# Patient Record
Sex: FEMALE | Race: WHITE | NOT HISPANIC OR LATINO | Employment: FULL TIME | URBAN - NONMETROPOLITAN AREA
[De-identification: names, ages, dates, MRNs, and addresses within clinical notes are randomized per-mention and may not be internally consistent; named-entity substitution may affect disease eponyms.]

---

## 2025-08-09 ENCOUNTER — OFFICE VISIT (OUTPATIENT)
Dept: URGENT CARE | Facility: CLINIC | Age: 65
End: 2025-08-09
Payer: COMMERCIAL

## 2025-08-09 VITALS
SYSTOLIC BLOOD PRESSURE: 165 MMHG | RESPIRATION RATE: 15 BRPM | TEMPERATURE: 97.5 F | DIASTOLIC BLOOD PRESSURE: 83 MMHG | OXYGEN SATURATION: 96 % | HEART RATE: 60 BPM

## 2025-08-09 DIAGNOSIS — L24.9 IRRITANT CONTACT DERMATITIS, UNSPECIFIED TRIGGER: Primary | ICD-10-CM

## 2025-08-09 PROCEDURE — 2500000004 HC RX 250 GENERAL PHARMACY W/ HCPCS (ALT 636 FOR OP/ED): Mod: JZ | Performed by: NURSE PRACTITIONER

## 2025-08-09 PROCEDURE — 96372 THER/PROPH/DIAG INJ SC/IM: CPT | Performed by: NURSE PRACTITIONER

## 2025-08-09 PROCEDURE — 99213 OFFICE O/P EST LOW 20 MIN: CPT | Performed by: NURSE PRACTITIONER

## 2025-08-09 RX ORDER — METHYLPREDNISOLONE SODIUM SUCCINATE 125 MG/2ML
125 INJECTION INTRAMUSCULAR; INTRAVENOUS ONCE
Status: COMPLETED | OUTPATIENT
Start: 2025-08-09 | End: 2025-08-09

## 2025-08-09 RX ORDER — CHOLECALCIFEROL (VITAMIN D3) 50 MCG
2000 TABLET ORAL
COMMUNITY

## 2025-08-09 RX ORDER — ALENDRONATE SODIUM 70 MG/1
TABLET ORAL
COMMUNITY

## 2025-08-09 RX ORDER — PREDNISONE 10 MG/1
TABLET ORAL
Qty: 30 TABLET | Refills: 0 | Status: SHIPPED | OUTPATIENT
Start: 2025-08-09

## 2025-08-09 RX ORDER — TRIAMCINOLONE ACETONIDE 5 MG/G
CREAM TOPICAL 3 TIMES DAILY
Qty: 30 G | Refills: 0 | Status: SHIPPED | OUTPATIENT
Start: 2025-08-09

## 2025-08-09 RX ORDER — SOLIFENACIN SUCCINATE 5 MG/1
TABLET, FILM COATED ORAL
COMMUNITY

## 2025-08-09 RX ADMIN — METHYLPREDNISOLONE SODIUM SUCCINATE 125 MG: 125 INJECTION, POWDER, FOR SOLUTION INTRAMUSCULAR; INTRAVENOUS at 08:41

## 2025-08-09 NOTE — PROGRESS NOTES
Subjective     HPI  64 y.o. female presents for evaluation of rash to bilateral upper and lower extremities, neck and face that began 5 days ago.  States she does have animals that do go outside but is unsure of any new exposures.  Has not been working outside weeding or anything similar recently.  Has tried Benadryl OTC without much improvement in symptoms.  No difficulty swallowing, breathing, cough, fever or other associated symptom complaint.    ROS  See HPI    Objective     Vitals:    08/09/25 0811   BP: 165/83   Pulse: 60   Resp: 15   Temp: 36.4 °C (97.5 °F)   SpO2: 96%       RX Allergies[1]    Medication Documentation Review Audit       Reviewed by Leonora Pinon MA (Medical Assistant) on 08/09/25 at 0810      Medication Order Taking? Sig Documenting Provider Last Dose Status   alendronate (Fosamax) 70 mg tablet [id number] Yes TAKE 1 TABLET EVERY 7 DAYS IN THE MORNING WITH A FULL GLASS OF WATER ON AN EMPTY STOMACH AND DO NOT TAKE ANYTHING ELSE BY MOUTH OR LIE DOWN FOR THE NEXT 30 MINUTES. Historical Provider, MD  Active   cholecalciferol (Vitamin D-3) 50 mcg (2,000 units) tablet [id number] Yes Take 1 tablet (2,000 Units) by mouth once daily. Historical Provider, MD  Active   solifenacin (VESIcare) 5 mg tablet [id number] Yes TAKE 1 TABLET DAILY.       SWALLOW TABLET WHOLE; DO   NOT CRUSH, CHEW, OR SPLIT. Historical Provider, MD  Active                    Medical History[2]    Surgical History[3]        Physical Exam  Vitals and nursing note reviewed.   Constitutional:       Appearance: Normal appearance.     Skin:     Findings: Rash (scattered patchy fine vesicular mildly erythematous rash over bilateral upper and lower extremities, neck and bilateral cheeks, right upper lip without any areas concerning for bacterial infection) present.     Neurological:      General: No focal deficit present.      Mental Status: She is alert and oriented to person, place, and time.     Psychiatric:         Mood and  Affect: Mood normal.         Behavior: Behavior normal.           Assessment     Assessment/Plan/MDM  Alma was seen today for rash.  Diagnoses and all orders for this visit:  Irritant contact dermatitis, unspecified trigger (Primary)  -     methylPREDNISolone sodium succinate (PF) (SOLU-Medrol) injection 125 mg  -     predniSONE (Deltasone) 10 mg tablet; 4 tabs po daily x 3 days, then 3 tabs po daily x 3 days, then 2 tab po daily  x3 days, then 1 tab po daily  x 3 days  -     triamcinolone (Kenalog) 0.5 % cream; Apply topically 3 times a day.    Encouraged patient to continue otc remedies as needed.    At time of discharge patient was clinically well-appearing and appropriate for outpatient management. She was educated regarding diagnosis, supportive care, OTC and any Rx medications. She was given the opportunity to ask questions prior to discharge.  She verbalized understanding of my discussion of the plans for treatment, expected course, indications to return to UC or seek further evaluation in ED, and the need for timely follow up as directed.    I did personally review Alma's past medical history, surgical history, social history, as well as family history (when relevant).  In this case, I also oversaw the her drug management by reviewing her medication list, allergy list, as well as the medications that I prescribed during the UC course and/or recommended as an out-patient (including possible OTC medications such as acetaminophen, NSAIDs , etc).    After reviewing the items above, I did look at previous medical documentation, such as recent hospitalizations, office visits, and/or recent consultations with PCP/specialist.                          SDOH:   Another factor that I considered in Alma's care was her Social Determinants of Health (SDOH). During this UC encounter, she did not have social determinants of health. Those SDOH influencing Alma's care are: none      Hung Willoughby CNP  [facility]  MAIN URGENT CARE         [1]   Allergies  Allergen Reactions    Acetaminophen-Codeine Hives    Codeine Hives    Tobramycin Other     Causes eyes to swell and shut   [2] No past medical history on file.  [3]   Past Surgical History:  Procedure Laterality Date    HYSTERECTOMY  06/24/2014    Hysterectomy